# Patient Record
Sex: MALE | Race: BLACK OR AFRICAN AMERICAN | URBAN - METROPOLITAN AREA
[De-identification: names, ages, dates, MRNs, and addresses within clinical notes are randomized per-mention and may not be internally consistent; named-entity substitution may affect disease eponyms.]

---

## 2021-10-02 PROBLEM — M54.9 INTRACTABLE BACK PAIN: Status: ACTIVE | Noted: 2021-10-02

## 2024-09-12 ENCOUNTER — FOLLOWUP TELEPHONE ENCOUNTER (OUTPATIENT)
Dept: AUDIOLOGY | Age: 34
End: 2024-09-12

## 2024-09-25 ENCOUNTER — TELEPHONE (OUTPATIENT)
Dept: AUDIOLOGY | Age: 34
End: 2024-09-25

## 2024-10-01 ENCOUNTER — TELEPHONE (OUTPATIENT)
Dept: AUDIOLOGY | Age: 34
End: 2024-10-01

## 2024-10-01 NOTE — TELEPHONE ENCOUNTER
Called patient's wife and LVM to schedule hearing aid evaluation.     Electronically signed by Feroz Landis on 10/1/2024 at 1:35 PM

## 2024-10-08 ENCOUNTER — PROCEDURE VISIT (OUTPATIENT)
Dept: AUDIOLOGY | Age: 34
End: 2024-10-08

## 2024-10-08 DIAGNOSIS — H90.3 SENSORINEURAL HEARING LOSS (SNHL) OF BOTH EARS: Primary | ICD-10-CM

## 2024-10-08 PROCEDURE — 99024 POSTOP FOLLOW-UP VISIT: CPT

## 2024-10-09 NOTE — PROGRESS NOTES
The patient came in for a hearing aid evaluation.  Hearing test results were reviewed and the patient is a candidate for hearing aids.  After all information needed is collected a prior authorization will be submitted to the patient's insurance.  The patient will be contacted regarding approval or denial.  Hearing aids were selected and will be ordered if an approval is received.    No prior auth needed. HA's ordered. Scheduled 10/17 for HAF.     Electronically signed by Feroz Landis on 10/9/2024 at 12:07 PM

## 2024-10-17 ENCOUNTER — PROCEDURE VISIT (OUTPATIENT)
Dept: AUDIOLOGY | Age: 34
End: 2024-10-17

## 2024-10-17 DIAGNOSIS — H90.3 SENSORINEURAL HEARING LOSS (SNHL) OF BOTH EARS: Primary | ICD-10-CM

## 2024-10-17 NOTE — PROGRESS NOTES
Fit with [facility] L50R binaural  LIU  hearing aids. Instructed in use and care. Gave  battery charger and warranty information. Made following adjustments: increased overall volume and fixed tinny sounds coming from the left hearing aid. Hearing aid contract/battery warning form reviewed and signed.      Hearing aids paired to phone lindsey.     Patient was satisfied and will follow up on above date, unless problems arise.     No charge visit today. Patient chose to waive 30 day trial and would like billing completed today. Patient satisfied with hearing aids.     BILLING DONE, DISP FEE AND BILATERAL HA. No authorization needed, no authorization number.     Feroz Landis/CCC-A  [place] Lic A.[id number]  Electronically signed by Feroz Landis on 10/17/2024 at 2:31 PM

## 2025-01-13 ENCOUNTER — TELEPHONE (OUTPATIENT)
Dept: AUDIOLOGY | Age: 35
End: 2025-01-13

## 2025-01-13 NOTE — TELEPHONE ENCOUNTER
Patient's wife called and LVM for audiologist to return phone call due to change in hearing/imaging. Called patient's wife back and LVM to return phone call to discuss.     Electronically signed by Feroz Landis on 1/13/2025 at 1:06 PM